# Patient Record
Sex: FEMALE | Race: WHITE
[De-identification: names, ages, dates, MRNs, and addresses within clinical notes are randomized per-mention and may not be internally consistent; named-entity substitution may affect disease eponyms.]

---

## 2020-04-27 ENCOUNTER — NURSE TRIAGE (OUTPATIENT)
Dept: OTHER | Facility: CLINIC | Age: 30
End: 2020-04-27

## 2020-04-27 NOTE — TELEPHONE ENCOUNTER
Reason for Disposition   MILD difficulty breathing (e.g., minimal/no SOB at rest, SOB with walking, pulse < 100) of new onset or worse than normal    Answer Assessment - Initial Assessment Questions  1. RESPIRATORY STATUS: \"Describe your breathing? \" (e.g., wheezing, shortness of breath, unable to speak, severe coughing)      Able to take a deep breath, but walking to kitchen makes her out of breath. Fees \"winded\" at times. 2. ONSET: \"When did this breathing problem begin? \"       Thursday mornning  3. PATTERN \"Does the difficult breathing come and go, or has it been constant since it started? \"       It \"comes in waves\"  4. SEVERITY: \"How bad is your breathing? \" (e.g., mild, moderate, severe)     - MILD: No SOB at rest, mild SOB with walking, speaks normally in sentences, can lay down, no retractions, pulse < 100.     - MODERATE: SOB at rest, SOB with minimal exertion and prefers to sit, cannot lie down flat, speaks in phrases, mild retractions, audible wheezing, pulse 100-120.     - SEVERE: Very SOB at rest, speaks in single words, struggling to breathe, sitting hunched forward, retractions, pulse > 120       On Thursday she says it was severe b/c she thinks she had an anxiety attack, but otherwise moderate  5. RECURRENT SYMPTOM: \"Have you had difficulty breathing before? \" If so, ask: \"When was the last time? \" and \"What happened that time? \"       She hopes it is stress related  6. CARDIAC HISTORY: \"Do you have any history of heart disease? \" (e.g., heart attack, angina, bypass surgery, angioplasty)       no  7. LUNG HISTORY: \"Do you have any history of lung disease? \"  (e.g., pulmonary embolus, asthma, emphysema)      no  8. CAUSE: \"What do you think is causing the breathing problem? \"       unsure  9. OTHER SYMPTOMS: \"Do you have any other symptoms? (e.g., dizziness, runny nose, cough, chest pain, fever)      Cough with tight chest, temp is 96-97, head ache   10. PREGNANCY: \"Is there any chance you are pregnant? \" \"When was your last menstrual period? \"        no  11. TRAVEL: \"Have you traveled out of the country in the last month? \" (e.g., travel history, exposures)        no    Protocols used: BREATHING DIFFICULTY-ADULT-[place]    Pt with symptoms as documented above. Pt informed of disposition. Pt educated on [employer] / web site, evisit and/or flu clinic. Care advice as documented. Please do not respond to the triage nurse through this encounter. Any subsequent communication should be directly with the patient.